# Patient Record
Sex: FEMALE | Race: WHITE | NOT HISPANIC OR LATINO | Employment: PART TIME | URBAN - METROPOLITAN AREA
[De-identification: names, ages, dates, MRNs, and addresses within clinical notes are randomized per-mention and may not be internally consistent; named-entity substitution may affect disease eponyms.]

---

## 2022-10-13 ENCOUNTER — HOSPITAL ENCOUNTER (OUTPATIENT)
Facility: HOSPITAL | Age: 72
LOS: 1 days | Discharge: HOME | End: 2022-10-14
Attending: EMERGENCY MEDICINE | Admitting: EMERGENCY MEDICINE
Payer: MEDICARE

## 2022-10-13 DIAGNOSIS — R07.9 CHEST PAIN: ICD-10-CM

## 2022-10-13 DIAGNOSIS — I47.10 SVT (SUPRAVENTRICULAR TACHYCARDIA): ICD-10-CM

## 2022-10-13 PROCEDURE — 21400001 HC TELEMETRY ROOM

## 2022-10-13 NOTE — PROVIDER TRANSFER
Outside Transfer Acceptance Note / [facility] Referral Center    Referring facility: [facility]- ED   Referring provider: TAWANA GREGORY  Accepting facility: [facility]  Accepting provider: NABIL PEACE  Admitting provider: GILBERTO BEE  Reason for transfer: Higher Level of Care   Transfer diagnosis: svt  Transfer specialty requested: Hospital Medicine  Transfer specialty notified: yes  Transfer level: NUMBER 1-5: 2  Bed type requested: tele  Isolation status: No active isolations   Admission class or status: Obs      Narrative     72-year-old female with a history of diabetes, paroxysmal SVT, hyperlipidemia, gout, gastroesophageal reflux, coronary artery disease, and hypertension presented to [facility] on October 13 with tachycardia. She was last seen by her Cardiologist at [facility] September 27. That note mentioned prior SVT that converted to sinus rhythm with adenosine.  She had cardiac catheterization April 13, 2022 (see results below). On October 13 she presented to the [facility] Emergency Department with palpitations for approximately 4 hours. She felt dizzy but had no chest pain or dyspnea. She attempted several vagal maneuvers at home without improvement.  On presentation she had blood pressure 85/55 with heart rate in the 150s. Initial EKG noted supraventricular tachycardia with no acute ST elevation. She received adenosine and converted to sinus rhythm. She also received aspirin. After conversion to sinus rhythm, repeat EKG had ventricular rate 82 within normal sinus rhythm and no acute ST elevation or depression noted.  Referring provider is requesting transfer for further cardiac evaluation.  Requesting transfer to Hospital Medicine at [facility] for further evaluation.    COVID pending  White blood cells 7.3, hemoglobin 11.9, hematocrit 37.9, platelets 282, sodium 133, potassium 4.4, chloride 96, CO2 25, BUN 21, creatinine 1, glucose 211,  calcium 9.6, AST 19, ALT 18, magnesium 1.8, , troponin 82.2 (normal range 8.4-18.3), BNP 49    Chest x-ray noted no acute cardiopulmonary disease.    VS:  Temperature 98.2°, pulse 75, blood pressure 116/85, O2 sats 99%    Left heart catheterization April 13, 2022:  SUMMARY OF PROCEDURE:        1. Right and left coronary angiogram.      2. Left heart catheterization.      3. Left ventriculogram.      4. Conscious sedation.      5. IFR study to the LAD.      6. FFR study to the LAD.     ASSESSMENT AND PLAN:        1. Angiographically moderate disease in the left anterior descending   with heavy calcification not significant by iFR and FFR criteria.      2. A 40% long lesion in the right coronary artery with heavy   calcification.      3. Otherwise, nonobstructive coronary artery disease.      4. Normal left ventricular end-diastolic pressure and ejection   fraction.     Objective     Vitals:  Temperature 98.2, pulse 75, blood pressure 116/85, O2 sats 99%  Allergies: Review of patient's allergies indicates:  Not on File   NPO: No    Instructions    Admit to Hospital Medicine    Upon patient arrival to floor, please contact Hospital Medicine on call.     SELMA Mar MD  Hospital Medicine Staff  Cell: [phone]

## 2022-10-14 VITALS
BODY MASS INDEX: 33.73 KG/M2 | RESPIRATION RATE: 18 BRPM | WEIGHT: 209.88 LBS | OXYGEN SATURATION: 99 % | TEMPERATURE: 99 F | HEIGHT: 66 IN | SYSTOLIC BLOOD PRESSURE: 163 MMHG | DIASTOLIC BLOOD PRESSURE: 68 MMHG | HEART RATE: 60 BPM

## 2022-10-14 PROBLEM — I21.4 NSTEMI (NON-ST ELEVATED MYOCARDIAL INFARCTION): Status: ACTIVE | Noted: 2022-10-14

## 2022-10-14 PROBLEM — N18.9 ANEMIA DUE TO CHRONIC KIDNEY DISEASE: Status: ACTIVE | Noted: 2022-04-12

## 2022-10-14 PROBLEM — R79.89 TROPONIN LEVEL ELEVATED: Status: ACTIVE | Noted: 2022-10-14

## 2022-10-14 PROBLEM — I25.10 CAD (CORONARY ARTERY DISEASE): Chronic | Status: ACTIVE | Noted: 2022-10-14

## 2022-10-14 PROBLEM — E11.8 DM (DIABETES MELLITUS), TYPE 2 WITH COMPLICATIONS: Status: ACTIVE | Noted: 2022-04-12

## 2022-10-14 PROBLEM — E11.8 DM (DIABETES MELLITUS), TYPE 2 WITH COMPLICATIONS: Chronic | Status: ACTIVE | Noted: 2022-04-12

## 2022-10-14 PROBLEM — I47.10 PAROXYSMAL SVT (SUPRAVENTRICULAR TACHYCARDIA): Chronic | Status: ACTIVE | Noted: 2022-04-12

## 2022-10-14 PROBLEM — E11.9 TYPE 2 DIABETES MELLITUS, WITHOUT LONG-TERM CURRENT USE OF INSULIN: Chronic | Status: ACTIVE | Noted: 2022-04-12

## 2022-10-14 PROBLEM — I10 PRIMARY HYPERTENSION: Chronic | Status: ACTIVE | Noted: 2022-10-14

## 2022-10-14 PROBLEM — D63.1 ANEMIA DUE TO CHRONIC KIDNEY DISEASE: Status: ACTIVE | Noted: 2022-04-12

## 2022-10-14 PROBLEM — I47.10 PAROXYSMAL SVT (SUPRAVENTRICULAR TACHYCARDIA): Status: ACTIVE | Noted: 2022-04-12

## 2022-10-14 PROBLEM — I47.10 SVT (SUPRAVENTRICULAR TACHYCARDIA): Status: ACTIVE | Noted: 2022-10-14

## 2022-10-14 LAB
ANION GAP SERPL CALC-SCNC: 10 MMOL/L (ref 8–16)
BASOPHILS # BLD AUTO: 0.02 K/UL (ref 0–0.2)
BASOPHILS NFR BLD: 0.3 % (ref 0–1.9)
BUN SERPL-MCNC: 17 MG/DL (ref 8–23)
CALCIUM SERPL-MCNC: 9.2 MG/DL (ref 8.7–10.5)
CHLORIDE SERPL-SCNC: 101 MMOL/L (ref 95–110)
CHOLEST SERPL-MCNC: 138 MG/DL (ref 120–199)
CHOLEST/HDLC SERPL: 4.8 {RATIO} (ref 2–5)
CO2 SERPL-SCNC: 26 MMOL/L (ref 23–29)
CREAT SERPL-MCNC: 0.8 MG/DL (ref 0.5–1.4)
DIFFERENTIAL METHOD: ABNORMAL
EOSINOPHIL # BLD AUTO: 0.1 K/UL (ref 0–0.5)
EOSINOPHIL NFR BLD: 1.9 % (ref 0–8)
ERYTHROCYTE [DISTWIDTH] IN BLOOD BY AUTOMATED COUNT: 13.2 % (ref 11.5–14.5)
EST. GFR  (NO RACE VARIABLE): >60 ML/MIN/1.73 M^2
ESTIMATED AVG GLUCOSE: 148 MG/DL (ref 68–131)
GLUCOSE SERPL-MCNC: 142 MG/DL (ref 70–110)
HBA1C MFR BLD: 6.8 % (ref 4–5.6)
HCT VFR BLD AUTO: 30.8 % (ref 37–48.5)
HDLC SERPL-MCNC: 29 MG/DL (ref 40–75)
HDLC SERPL: 21 % (ref 20–50)
HGB BLD-MCNC: 9.9 G/DL (ref 12–16)
IMM GRANULOCYTES # BLD AUTO: 0.02 K/UL (ref 0–0.04)
IMM GRANULOCYTES NFR BLD AUTO: 0.3 % (ref 0–0.5)
LDLC SERPL CALC-MCNC: 83 MG/DL (ref 63–159)
LYMPHOCYTES # BLD AUTO: 1.5 K/UL (ref 1–4.8)
LYMPHOCYTES NFR BLD: 22.4 % (ref 18–48)
MAGNESIUM SERPL-MCNC: 1.6 MG/DL (ref 1.6–2.6)
MCH RBC QN AUTO: 29.6 PG (ref 27–31)
MCHC RBC AUTO-ENTMCNC: 32.1 G/DL (ref 32–36)
MCV RBC AUTO: 92 FL (ref 82–98)
MONOCYTES # BLD AUTO: 0.5 K/UL (ref 0.3–1)
MONOCYTES NFR BLD: 7.6 % (ref 4–15)
NEUTROPHILS # BLD AUTO: 4.5 K/UL (ref 1.8–7.7)
NEUTROPHILS NFR BLD: 67.5 % (ref 38–73)
NONHDLC SERPL-MCNC: 109 MG/DL
NRBC BLD-RTO: 0 /100 WBC
PLATELET # BLD AUTO: 202 K/UL (ref 150–450)
PMV BLD AUTO: 12 FL (ref 9.2–12.9)
POCT GLUCOSE: 132 MG/DL (ref 70–110)
POCT GLUCOSE: 138 MG/DL (ref 70–110)
POTASSIUM SERPL-SCNC: 4.3 MMOL/L (ref 3.5–5.1)
RBC # BLD AUTO: 3.34 M/UL (ref 4–5.4)
SODIUM SERPL-SCNC: 137 MMOL/L (ref 136–145)
TRIGL SERPL-MCNC: 130 MG/DL (ref 30–150)
TROPONIN I SERPL DL<=0.01 NG/ML-MCNC: 0.52 NG/ML (ref 0–0.03)
TROPONIN I SERPL DL<=0.01 NG/ML-MCNC: 0.67 NG/ML (ref 0–0.03)
WBC # BLD AUTO: 6.71 K/UL (ref 3.9–12.7)

## 2022-10-14 PROCEDURE — 84484 ASSAY OF TROPONIN QUANT: CPT | Mod: 91 | Performed by: NURSE PRACTITIONER

## 2022-10-14 PROCEDURE — 36415 COLL VENOUS BLD VENIPUNCTURE: CPT | Performed by: NURSE PRACTITIONER

## 2022-10-14 PROCEDURE — 85025 COMPLETE CBC W/AUTO DIFF WBC: CPT | Performed by: NURSE PRACTITIONER

## 2022-10-14 PROCEDURE — 25000003 PHARM REV CODE 250: Performed by: NURSE PRACTITIONER

## 2022-10-14 PROCEDURE — 99222 PR INITIAL HOSPITAL CARE,LEVL II: ICD-10-PCS | Mod: ,,,

## 2022-10-14 PROCEDURE — 25000003 PHARM REV CODE 250: Performed by: INTERNAL MEDICINE

## 2022-10-14 PROCEDURE — 80061 LIPID PANEL: CPT | Performed by: NURSE PRACTITIONER

## 2022-10-14 PROCEDURE — 63600175 PHARM REV CODE 636 W HCPCS: Performed by: FAMILY MEDICINE

## 2022-10-14 PROCEDURE — 83735 ASSAY OF MAGNESIUM: CPT | Performed by: NURSE PRACTITIONER

## 2022-10-14 PROCEDURE — G0378 HOSPITAL OBSERVATION PER HR: HCPCS

## 2022-10-14 PROCEDURE — 83036 HEMOGLOBIN GLYCOSYLATED A1C: CPT | Performed by: NURSE PRACTITIONER

## 2022-10-14 PROCEDURE — 25000003 PHARM REV CODE 250: Performed by: FAMILY MEDICINE

## 2022-10-14 PROCEDURE — 80048 BASIC METABOLIC PNL TOTAL CA: CPT | Performed by: NURSE PRACTITIONER

## 2022-10-14 PROCEDURE — 99222 1ST HOSP IP/OBS MODERATE 55: CPT | Mod: ,,,

## 2022-10-14 RX ORDER — TALC
6 POWDER (GRAM) TOPICAL NIGHTLY PRN
Status: DISCONTINUED | OUTPATIENT
Start: 2022-10-14 | End: 2022-10-14 | Stop reason: HOSPADM

## 2022-10-14 RX ORDER — ENOXAPARIN SODIUM 100 MG/ML
1 INJECTION SUBCUTANEOUS
Status: DISCONTINUED | OUTPATIENT
Start: 2022-10-14 | End: 2022-10-14 | Stop reason: HOSPADM

## 2022-10-14 RX ORDER — HYDROCODONE BITARTRATE AND ACETAMINOPHEN 7.5; 325 MG/1; MG/1
1 TABLET ORAL 2 TIMES DAILY PRN
Status: DISCONTINUED | OUTPATIENT
Start: 2022-10-14 | End: 2022-10-14 | Stop reason: HOSPADM

## 2022-10-14 RX ORDER — LISINOPRIL 5 MG/1
5 TABLET ORAL DAILY
Status: ON HOLD | COMMUNITY
Start: 2022-09-21 | End: 2022-10-14 | Stop reason: HOSPADM

## 2022-10-14 RX ORDER — ALLOPURINOL 300 MG/1
300 TABLET ORAL DAILY
COMMUNITY
Start: 2022-07-08

## 2022-10-14 RX ORDER — ATORVASTATIN CALCIUM 40 MG/1
40 TABLET, FILM COATED ORAL DAILY
Status: DISCONTINUED | OUTPATIENT
Start: 2022-10-14 | End: 2022-10-14 | Stop reason: HOSPADM

## 2022-10-14 RX ORDER — ONDANSETRON 2 MG/ML
4 INJECTION INTRAMUSCULAR; INTRAVENOUS EVERY 8 HOURS PRN
Status: DISCONTINUED | OUTPATIENT
Start: 2022-10-14 | End: 2022-10-14 | Stop reason: HOSPADM

## 2022-10-14 RX ORDER — LOVASTATIN 20 MG/1
20 TABLET ORAL DAILY
COMMUNITY
Start: 2022-07-08

## 2022-10-14 RX ORDER — METOPROLOL TARTRATE 25 MG/1
25 TABLET, FILM COATED ORAL 2 TIMES DAILY
Status: DISCONTINUED | OUTPATIENT
Start: 2022-10-14 | End: 2022-10-14 | Stop reason: HOSPADM

## 2022-10-14 RX ORDER — LATANOPROST 50 UG/ML
1 SOLUTION/ DROPS OPHTHALMIC NIGHTLY
COMMUNITY
Start: 2022-09-26

## 2022-10-14 RX ORDER — FERROUS SULFATE 324(65)MG
324 TABLET, DELAYED RELEASE (ENTERIC COATED) ORAL
COMMUNITY

## 2022-10-14 RX ORDER — LISINOPRIL 5 MG/1
5 TABLET ORAL ONCE
Status: COMPLETED | OUTPATIENT
Start: 2022-10-14 | End: 2022-10-14

## 2022-10-14 RX ORDER — ASPIRIN 81 MG/1
81 TABLET ORAL DAILY
Status: DISCONTINUED | OUTPATIENT
Start: 2022-10-14 | End: 2022-10-14 | Stop reason: HOSPADM

## 2022-10-14 RX ORDER — GLUCAGON 1 MG
1 KIT INJECTION
Status: DISCONTINUED | OUTPATIENT
Start: 2022-10-14 | End: 2022-10-14 | Stop reason: HOSPADM

## 2022-10-14 RX ORDER — TEMAZEPAM 30 MG/1
30 CAPSULE ORAL NIGHTLY
COMMUNITY
Start: 2022-10-05

## 2022-10-14 RX ORDER — LISINOPRIL 10 MG/1
10 TABLET ORAL DAILY
Status: DISCONTINUED | OUTPATIENT
Start: 2022-10-15 | End: 2022-10-14 | Stop reason: HOSPADM

## 2022-10-14 RX ORDER — LATANOPROST 50 UG/ML
1 SOLUTION/ DROPS OPHTHALMIC NIGHTLY
Status: DISCONTINUED | OUTPATIENT
Start: 2022-10-14 | End: 2022-10-14 | Stop reason: HOSPADM

## 2022-10-14 RX ORDER — PROMETHAZINE HYDROCHLORIDE 25 MG/1
25 TABLET ORAL EVERY 6 HOURS PRN
Status: DISCONTINUED | OUTPATIENT
Start: 2022-10-14 | End: 2022-10-14 | Stop reason: HOSPADM

## 2022-10-14 RX ORDER — METFORMIN HYDROCHLORIDE 1000 MG/1
1000 TABLET ORAL 3 TIMES DAILY
COMMUNITY
Start: 2022-09-02

## 2022-10-14 RX ORDER — NALOXONE HYDROCHLORIDE 4 MG/.1ML
SPRAY NASAL
COMMUNITY
Start: 2022-08-11

## 2022-10-14 RX ORDER — METOPROLOL TARTRATE 25 MG/1
25 TABLET, FILM COATED ORAL 2 TIMES DAILY
Qty: 60 TABLET | Refills: 3 | Status: SHIPPED | OUTPATIENT
Start: 2022-10-14 | End: 2023-10-14

## 2022-10-14 RX ORDER — INSULIN ASPART 100 [IU]/ML
0-5 INJECTION, SOLUTION INTRAVENOUS; SUBCUTANEOUS
Status: DISCONTINUED | OUTPATIENT
Start: 2022-10-14 | End: 2022-10-14 | Stop reason: HOSPADM

## 2022-10-14 RX ORDER — IBUPROFEN 200 MG
24 TABLET ORAL
Status: DISCONTINUED | OUTPATIENT
Start: 2022-10-14 | End: 2022-10-14 | Stop reason: HOSPADM

## 2022-10-14 RX ORDER — IBUPROFEN 200 MG
16 TABLET ORAL
Status: DISCONTINUED | OUTPATIENT
Start: 2022-10-14 | End: 2022-10-14 | Stop reason: HOSPADM

## 2022-10-14 RX ORDER — ATENOLOL 100 MG/1
100 TABLET ORAL DAILY
Status: ON HOLD | COMMUNITY
Start: 2022-07-08 | End: 2022-10-14 | Stop reason: HOSPADM

## 2022-10-14 RX ORDER — OMEPRAZOLE 20 MG/1
20 CAPSULE, DELAYED RELEASE ORAL DAILY
COMMUNITY
Start: 2022-09-02

## 2022-10-14 RX ORDER — ACETAMINOPHEN 325 MG/1
650 TABLET ORAL EVERY 6 HOURS PRN
Status: DISCONTINUED | OUTPATIENT
Start: 2022-10-14 | End: 2022-10-14 | Stop reason: HOSPADM

## 2022-10-14 RX ORDER — LISINOPRIL 5 MG/1
5 TABLET ORAL DAILY
Status: DISCONTINUED | OUTPATIENT
Start: 2022-10-14 | End: 2022-10-14

## 2022-10-14 RX ORDER — BISACODYL 10 MG
10 SUPPOSITORY, RECTAL RECTAL DAILY PRN
Status: DISCONTINUED | OUTPATIENT
Start: 2022-10-14 | End: 2022-10-14 | Stop reason: HOSPADM

## 2022-10-14 RX ORDER — VENLAFAXINE HYDROCHLORIDE 75 MG/1
150 CAPSULE, EXTENDED RELEASE ORAL DAILY
Status: DISCONTINUED | OUTPATIENT
Start: 2022-10-14 | End: 2022-10-14 | Stop reason: HOSPADM

## 2022-10-14 RX ORDER — LANOLIN ALCOHOL/MO/W.PET/CERES
1 CREAM (GRAM) TOPICAL DAILY
Status: DISCONTINUED | OUTPATIENT
Start: 2022-10-14 | End: 2022-10-14 | Stop reason: HOSPADM

## 2022-10-14 RX ORDER — GLIMEPIRIDE 4 MG/1
4 TABLET ORAL EVERY MORNING
COMMUNITY
Start: 2022-07-08

## 2022-10-14 RX ORDER — ASPIRIN 81 MG/1
81 TABLET ORAL DAILY
Qty: 90 TABLET | Refills: 3 | Status: SHIPPED | OUTPATIENT
Start: 2022-10-15 | End: 2023-10-15

## 2022-10-14 RX ORDER — SODIUM CHLORIDE 0.9 % (FLUSH) 0.9 %
10 SYRINGE (ML) INJECTION EVERY 12 HOURS PRN
Status: DISCONTINUED | OUTPATIENT
Start: 2022-10-14 | End: 2022-10-14 | Stop reason: HOSPADM

## 2022-10-14 RX ORDER — HYDROCODONE BITARTRATE AND ACETAMINOPHEN 7.5; 325 MG/1; MG/1
1 TABLET ORAL 2 TIMES DAILY PRN
COMMUNITY
Start: 2022-10-07

## 2022-10-14 RX ORDER — NITROGLYCERIN 0.4 MG/1
0.4 TABLET SUBLINGUAL EVERY 5 MIN PRN
Status: DISCONTINUED | OUTPATIENT
Start: 2022-10-14 | End: 2022-10-14 | Stop reason: HOSPADM

## 2022-10-14 RX ORDER — CHLORTHALIDONE 25 MG/1
25 TABLET ORAL DAILY
COMMUNITY
Start: 2022-07-11

## 2022-10-14 RX ORDER — NALOXONE HCL 0.4 MG/ML
0.02 VIAL (ML) INJECTION
Status: DISCONTINUED | OUTPATIENT
Start: 2022-10-14 | End: 2022-10-14 | Stop reason: HOSPADM

## 2022-10-14 RX ORDER — ALENDRONATE SODIUM 70 MG/1
70 TABLET ORAL
COMMUNITY
Start: 2022-09-02

## 2022-10-14 RX ORDER — ONDANSETRON 8 MG/1
8 TABLET, ORALLY DISINTEGRATING ORAL 3 TIMES DAILY PRN
COMMUNITY
Start: 2022-09-02

## 2022-10-14 RX ORDER — MAG HYDROX/ALUMINUM HYD/SIMETH 200-200-20
30 SUSPENSION, ORAL (FINAL DOSE FORM) ORAL 4 TIMES DAILY PRN
Status: DISCONTINUED | OUTPATIENT
Start: 2022-10-14 | End: 2022-10-14 | Stop reason: HOSPADM

## 2022-10-14 RX ORDER — LISINOPRIL 10 MG/1
10 TABLET ORAL DAILY
Qty: 30 TABLET | Refills: 3 | Status: SHIPPED | OUTPATIENT
Start: 2022-10-14 | End: 2022-11-13

## 2022-10-14 RX ORDER — VENLAFAXINE HYDROCHLORIDE 150 MG/1
150 CAPSULE, EXTENDED RELEASE ORAL 2 TIMES DAILY
COMMUNITY
Start: 2022-09-02

## 2022-10-14 RX ADMIN — FERROUS SULFATE TAB 325 MG (65 MG ELEMENTAL FE) 1 EACH: 325 (65 FE) TAB at 09:10

## 2022-10-14 RX ADMIN — ATORVASTATIN CALCIUM 40 MG: 40 TABLET, FILM COATED ORAL at 09:10

## 2022-10-14 RX ADMIN — ENOXAPARIN SODIUM 100 MG: 100 INJECTION SUBCUTANEOUS at 03:10

## 2022-10-14 RX ADMIN — VENLAFAXINE HYDROCHLORIDE 150 MG: 75 CAPSULE, EXTENDED RELEASE ORAL at 09:10

## 2022-10-14 RX ADMIN — LISINOPRIL 5 MG: 5 TABLET ORAL at 12:10

## 2022-10-14 RX ADMIN — METOPROLOL TARTRATE 25 MG: 25 TABLET, FILM COATED ORAL at 09:10

## 2022-10-14 RX ADMIN — ASPIRIN 81 MG: 81 TABLET, COATED ORAL at 09:10

## 2022-10-14 RX ADMIN — LISINOPRIL 5 MG: 5 TABLET ORAL at 09:10

## 2022-10-14 NOTE — DISCHARGE SUMMARY
[facility] Telemetry ([facility] Medicine  Discharge Summary      Patient Name: Che An  MRN: [id number]  Patient Class: OP- Observation  Admission Date: 10/13/2022  Hospital Length of Stay: 1 days  Discharge Date and Time:  10/14/2022 12:21 PM  Attending Physician: Dex Powell MD   Discharging Provider: Moreno Carter MD  Primary Care Provider: Caridad Gentile MD      HPI:   72-year-old female with a history of diabetes, paroxysmal SVT, hyperlipidemia, gout, gastroesophageal reflux, coronary artery disease, and hypertension presented to [place] [facility] on October 13 with tachycardia. She was last seen by her Cardiologist at [place] September 27. That note mentioned prior SVT that converted to sinus rhythm with adenosine.  She had cardiac catheterization April 13, 2022 (see results below). On October 13 she presented to the [facility] Emergency Department with palpitations for approximately 4 hours. She felt dizzy but had no chest pain or dyspnea. She attempted several vagal maneuvers at home without improvement.  On presentation she had blood pressure 85/55 with heart rate in the 150s. Initial EKG noted supraventricular tachycardia with no acute ST elevation. She received adenosine and converted to sinus rhythm. She also received aspirin. After conversion to sinus rhythm, repeat EKG had ventricular rate 82 within normal sinus rhythm and no acute ST elevation or depression noted.  Referring provider is requesting transfer for further cardiac evaluation.  Requesting transfer to Hospital Medicine at [facility] for further evaluation.     COVID pending  White blood cells 7.3, hemoglobin 11.9, hematocrit 37.9, platelets 282, sodium 133, potassium 4.4, chloride 96, CO2 25, BUN 21, creatinine 1, glucose 211, calcium 9.6, AST 19, ALT 18, magnesium 1.8, , troponin 82.2 (normal range 8.4-18.3), BNP 49     Chest x-ray noted no acute cardiopulmonary disease.     Left heart  catheterization April 13, 2022:  SUMMARY OF PROCEDURE:        1. Right and left coronary angiogram.      2. Left heart catheterization.      3. Left ventriculogram.      4. Conscious sedation.      5. IFR study to the LAD.      6. FFR study to the LAD.     ASSESSMENT AND PLAN:        1. Angiographically moderate disease in the left anterior descending   with heavy calcification not significant by iFR and FFR criteria.      2. A 40% long lesion in the right coronary artery with heavy   calcification.      3. Otherwise, nonobstructive coronary artery disease.      4. Normal left ventricular end-diastolic pressure and ejection   fraction.       * No surgery found *      Hospital Course:   71 y/o WF admitted with a dx of SVT and elevated troponin .  She is s/p adenosine at the ER and convert back to SNR .  Cardiology consulted and rec no further intervention and can be d/c home today . The atenolol changed to metoprolol 25 mg  po bid and lisinopril form 5 mg po qd to 10 mg po daily .  Per cardiology  troponin elevation due to demand ischemia / SVT episode . Pt was seen and examined at bedside . She was determined to be suitable for D/C . She was  advised to f/u with cardiology , EPS and PCP in 1 to 2 weeks .        Goals of Care Treatment Preferences:  Code Status: Full Code      Consults:   Consults (From admission, onward)        Status Ordering Provider     Inpatient consult to Cardiology  Once        Provider:  (Not yet assigned)    Completed AZUL COLINDRES          CAD (coronary artery disease)  - Continue medical management as above.      Primary hypertension  - Continue home antihypertensives.   -increased lisinopril from 5 mg po qd to 10 mg po qd     Type 2 diabetes mellitus, without long-term current use of insulin  Patient's FSGs are controlled on current medication regimen.  Last A1c reviewed-   Lab Results   Component Value Date    HGBA1C 6.8 (H) 10/14/2022     Most recent fingerstick glucose reviewed-   Recent  Labs   Lab 10/14/22  0537 10/14/22  1104   POCTGLUCOSE 138* 132*     Current correctional scale  Low  Maintain anti-hyperglycemic dose as follows-   Antihyperglycemics (From admission, onward)    Start     Stop Route Frequency Ordered    10/14/22 0304  insulin aspart U-100 pen 0-5 Units         -- SubQ Before meals & nightly PRN 10/14/22 0205        Hold Oral hypoglycemics while patient is in the hospital.      Paroxysmal SVT (supraventricular tachycardia)  - Converted back to sinus rhythm after adenosine given in the ED. Monitor telemetry.   - Started on metoprolol   -F/U with cardiology and EPS outpt          Final Active Diagnoses:    Diagnosis Date Noted POA    PRINCIPAL PROBLEM:  Troponin level elevated [R77.8] 10/14/2022 Yes    Primary hypertension [I10] 10/14/2022 Yes     Chronic    CAD (coronary artery disease) [I25.10] 10/14/2022 Yes     Chronic    Paroxysmal SVT (supraventricular tachycardia) [I47.1] 04/12/2022 Yes     Chronic    Type 2 diabetes mellitus, without long-term current use of insulin [E11.9] 04/12/2022 Yes     Chronic      Problems Resolved During this Admission:       Discharged Condition: stable    Disposition: Home or Self Care    Follow Up:   Follow-up Information     Caridad Gentile MD Follow up in 1 week(s).    Specialty: Family Medicine  Contact information:  [address] [place] [id number] [phone]                       Patient Instructions:      Diet Cardiac     Activity as tolerated       Significant Diagnostic Studies: Labs:   BMP:   Recent Labs   Lab 10/14/22  0453   *      K 4.3      CO2 26   BUN 17   CREATININE 0.8   CALCIUM 9.2   MG 1.6   , CMP   Recent Labs   Lab 10/14/22  0453      K 4.3      CO2 26   *   BUN 17   CREATININE 0.8   CALCIUM 9.2   ANIONGAP 10    and CBC   Recent Labs   Lab 10/14/22  0453   WBC 6.71   HGB 9.9*   HCT 30.8*        Microbiology: Blood Culture No results found for: LABBLOO    Pending  Diagnostic Studies:     Procedure Component Value Units Date/Time    Troponin I [[id number]]     Order Status: Sent Lab Status: No result     Specimen: Blood          Medications:  Reconciled Home Medications:      Medication List      START taking these medications    aspirin 81 MG EC tablet  Commonly known as: ECOTRIN  Take 1 tablet (81 mg total) by mouth once daily.  Start taking on: October 15, 2022     metoprolol tartrate 25 MG tablet  Commonly known as: LOPRESSOR  Take 1 tablet (25 mg total) by mouth 2 (two) times daily.        CHANGE how you take these medications    lisinopriL 10 MG tablet  Take 1 tablet (10 mg total) by mouth once daily.  What changed:   · medication strength  · how much to take        CONTINUE taking these medications    alendronate 70 MG tablet  Commonly known as: FOSAMAX  Take 70 mg by mouth every 7 days.     allopurinoL 300 MG tablet  Commonly known as: ZYLOPRIM  Take 300 mg by mouth once daily.     chlorthalidone 25 MG Tab  Commonly known as: HYGROTEN  Take 25 mg by mouth once daily.     ferrous sulfate 324 mg (65 mg iron) Tbec  Take 324 mg by mouth.     glimepiride 4 MG tablet  Commonly known as: AMARYL  Take 4 mg by mouth every morning.     HYDROcodone-acetaminophen 7.5-325 mg per tablet  Commonly known as: NORCO  Take 1 tablet by mouth 2 (two) times daily as needed.     latanoprost 0.005 % ophthalmic solution  Place 1 drop into both eyes every evening.     lovastatin 20 MG tablet  Commonly known as: MEVACOR  Take 20 mg by mouth once daily.     metFORMIN 1000 MG tablet  Commonly known as: GLUCOPHAGE  Take 1,000 mg by mouth 3 (three) times daily.     naloxone 4 mg/actuation Spry  Commonly known as: NARCAN  as directed     omeprazole 20 MG capsule  Commonly known as: PRILOSEC  Take 20 mg by mouth once daily.     ondansetron 8 MG Tbdl  Commonly known as: ZOFRAN-ODT  Take 8 mg by mouth 3 (three) times daily as needed.     temazepam 30 mg capsule  Commonly known as: RESTORIL  Take 30 mg by  mouth every evening.     venlafaxine 150 MG Cp24  Commonly known as: EFFEXOR-XR  Take 150 mg by mouth 2 (two) times daily.        STOP taking these medications    atenoloL 100 MG tablet  Commonly known as: TENORMIN            Indwelling Lines/Drains at time of discharge:   Lines/Drains/Airways     None                 Time spent on the discharge of patient: 31 minutes         Moreno Carter MD  Department of Hospital Medicine  [facility] - Telemetry ([facility])

## 2022-10-14 NOTE — HOSPITAL COURSE
71 y/o WF admitted with a dx of SVT and elevated troponin .  She is s/p adenosine at the ER and convert back to SNR .  Cardiology consulted and rec no further intervention and can be d/c home today . The atenolol changed to metoprolol 25 mg  po bid and lisinopril form 5 mg po qd to 10 mg po daily .  Per cardiology  troponin elevation due to demand ischemia / SVT episode . Pt was seen and examined at bedside . She was determined to be suitable for D/C . She was  advised to f/u with cardiology , EPS and PCP in 1 to 2 weeks .

## 2022-10-14 NOTE — PLAN OF CARE
Pt A&O x4. IV intact, dry, and clean. NS on monitor. Pt ambulates and turns in bed independently. On room air. No pain reported. NPO at this time. Awaiting orders from provider. Instructed to call for assistance. Hourly rounding completed.

## 2022-10-14 NOTE — ASSESSMENT & PLAN NOTE
- Type I vs. Type II demand secondary to SVT. Troponin 82.2 (normal range 8.4-18.3) in the ED. EKG without acute ischemic ST-T changes. No CP or anginal equivalent.     - Will give therapeutic dose Lovenox.  - ASA, BB, and statin.    - Trend serial cardiac enzymes and EKG.  - Check FLP.  - Cardiology consult. Will keep NPO for possible LHC.

## 2022-10-14 NOTE — HPI
72-year-old female with a history of diabetes, paroxysmal SVT, hyperlipidemia, gout, gastroesophageal reflux, coronary artery disease, and hypertension presented to [place] [facility] on October 13 with tachycardia. She was last seen by her Cardiologist at [place] September 27. That note mentioned prior SVT that converted to sinus rhythm with adenosine.  She had cardiac catheterization April 13, 2022 (see results below). On October 13 she presented to the [facility] Emergency Department with palpitations for approximately 4 hours. She felt dizzy but had no chest pain or dyspnea. She attempted several vagal maneuvers at home without improvement.  On presentation she had blood pressure 85/55 with heart rate in the 150s. Initial EKG noted supraventricular tachycardia with no acute ST elevation. She received adenosine and converted to sinus rhythm. She also received aspirin. After conversion to sinus rhythm, repeat EKG had ventricular rate 82 within normal sinus rhythm and no acute ST elevation or depression noted.  Referring provider is requesting transfer for further cardiac evaluation.  Requesting transfer to Hospital Medicine at [facility] for further evaluation. In ED White blood cells 7.3, hemoglobin 11.9, hematocrit 37.9, platelets 282, sodium 133, potassium 4.4, chloride 96, CO2 25, BUN 21, creatinine 1, glucose 211, calcium 9.6, AST 19, ALT 18, magnesium 1.8, , troponin 82.2 (normal range 8.4-18.3), BNP 49. Chest x-ray noted no acute cardiopulmonary disease.     Left heart catheterization April 13, 2022:  SUMMARY OF PROCEDURE:        1. Right and left coronary angiogram.      2. Left heart catheterization.      3. Left ventriculogram.      4. Conscious sedation.      5. IFR study to the LAD.      6. FFR study to the LAD.     ASSESSMENT AND PLAN:        1. Angiographically moderate disease in the left anterior descending   with heavy calcification not significant by iFR and FFR criteria.       2. A 40% long lesion in the right coronary artery with heavy   calcification.      3. Otherwise, nonobstructive coronary artery disease.      4. Normal left ventricular end-diastolic pressure and ejection   fraction.     Cardiology consulted for NSTEMI, SVT. Pt seen and examined today, resting comfortably in bed, no CV complaints at this time. EKG reviewed NSR, Labs reviewed Troponin .671-> .519. Pt sees Dr. Rolle in [place]. Needs follow up to discuss ablation, SAIDA workup OP

## 2022-10-14 NOTE — SUBJECTIVE & OBJECTIVE
Past Medical History:   Diagnosis Date    Coronary artery disease     Diabetes mellitus     GERD (gastroesophageal reflux disease)     Gout, unspecified     Hyperlipidemia     Hypertension     SVT (supraventricular tachycardia)        Past Surgical History:   Procedure Laterality Date    CARDIAC CATHETERIZATION         Review of patient's allergies indicates:   Allergen Reactions    Adhesive        No current facility-administered medications on file prior to encounter.     Current Outpatient Medications on File Prior to Encounter   Medication Sig    alendronate (FOSAMAX) 70 MG tablet Take 70 mg by mouth every 7 days.    allopurinoL (ZYLOPRIM) 300 MG tablet Take 300 mg by mouth once daily.    atenoloL (TENORMIN) 100 MG tablet Take 100 mg by mouth once daily.    chlorthalidone (HYGROTEN) 25 MG Tab Take 25 mg by mouth once daily.    ferrous sulfate 324 mg (65 mg iron) TbEC Take 324 mg by mouth.    glimepiride (AMARYL) 4 MG tablet Take 4 mg by mouth every morning.    HYDROcodone-acetaminophen (NORCO) 7.5-325 mg per tablet Take 1 tablet by mouth 2 (two) times daily as needed.    latanoprost 0.005 % ophthalmic solution Place 1 drop into both eyes every evening.    lisinopriL (PRINIVIL,ZESTRIL) 5 MG tablet Take 5 mg by mouth once daily.    lovastatin (MEVACOR) 20 MG tablet Take 20 mg by mouth once daily.    metFORMIN (GLUCOPHAGE) 1000 MG tablet Take 1,000 mg by mouth 3 (three) times daily.    naloxone (NARCAN) 4 mg/actuation Spry as directed    omeprazole (PRILOSEC) 20 MG capsule Take 20 mg by mouth once daily.    ondansetron (ZOFRAN-ODT) 8 MG TbDL Take 8 mg by mouth 3 (three) times daily as needed.    temazepam (RESTORIL) 30 mg capsule Take 30 mg by mouth every evening.    venlafaxine (EFFEXOR-XR) 150 MG Cp24 Take 150 mg by mouth 2 (two) times daily.     Family History    None       Tobacco Use    Smoking status: Never    Smokeless tobacco: Never   Substance and Sexual Activity    Alcohol use: Not Currently    Drug use:  Never    Sexual activity: Not on file     Review of Systems   Constitutional: Negative.   HENT: Negative.     Eyes: Negative.    Cardiovascular: Negative.    Respiratory: Negative.     Skin: Negative.    Musculoskeletal: Negative.    Gastrointestinal: Negative.    Genitourinary: Negative.    Neurological: Negative.    Psychiatric/Behavioral: Negative.     Objective:     Vital Signs (Most Recent):  Temp: 97.8 °F (36.6 °C) (10/14/22 0751)  Pulse: 62 (10/14/22 0759)  Resp: 16 (10/14/22 0741)  BP: (!) 150/66 (10/14/22 0803)  SpO2: 97 % (10/14/22 0741)   Vital Signs (24h Range):  Temp:  [97.8 °F (36.6 °C)-98.3 °F (36.8 °C)] 97.8 °F (36.6 °C)  Pulse:  [56-75] 62  Resp:  [16-20] 16  SpO2:  [97 %-100 %] 97 %  BP: (116-185)/(59-85) 150/66     Weight: 95.2 kg (209 lb 14.1 oz)  Body mass index is 33.88 kg/m².    SpO2: 97 %  O2 Device (Oxygen Therapy): room air    No intake or output data in the 24 hours ending 10/14/22 1053    Lines/Drains/Airways       Peripheral Intravenous Line  Duration                  Peripheral IV - Single Lumen 10/13/22 2000 20 G Anterior;Right Forearm <1 day                    Physical Exam  Vitals and nursing note reviewed.   Constitutional:       Appearance: Normal appearance.   HENT:      Head: Normocephalic.   Eyes:      Pupils: Pupils are equal, round, and reactive to light.   Cardiovascular:      Rate and Rhythm: Normal rate and regular rhythm.      Heart sounds: Normal heart sounds, S1 normal and S2 normal. No murmur heard.    No S3 or S4 sounds.   Pulmonary:      Effort: Pulmonary effort is normal.      Breath sounds: Normal breath sounds.   Abdominal:      General: Bowel sounds are normal.      Palpations: Abdomen is soft.   Musculoskeletal:         General: Normal range of motion.      Cervical back: Normal range of motion.   Skin:     Capillary Refill: Capillary refill takes less than 2 seconds.   Neurological:      General: No focal deficit present.      Mental Status: She is alert and  oriented to person, place, and time.   Psychiatric:         Mood and Affect: Mood normal.         Behavior: Behavior normal.         Thought Content: Thought content normal.       Significant Labs: BMP:   Recent Labs   Lab 10/14/22  0453   *      K 4.3      CO2 26   BUN 17   CREATININE 0.8   CALCIUM 9.2   MG 1.6   , CMP   Recent Labs   Lab 10/14/22  0453      K 4.3      CO2 26   *   BUN 17   CREATININE 0.8   CALCIUM 9.2   ANIONGAP 10   , CBC   Recent Labs   Lab 10/14/22  0453   WBC 6.71   HGB 9.9*   HCT 30.8*      , INR No results for input(s): INR, PROTIME in the last 48 hours., Lipid Panel No results for input(s): CHOL, HDL, LDLCALC, TRIG, CHOLHDL in the last 48 hours., Troponin   Recent Labs   Lab 10/14/22  0232 10/14/22  0840   TROPONINI 0.671* 0.519*   , and All pertinent lab results from the last 24 hours have been reviewed.    Significant Imaging: and EKG: Reviewed

## 2022-10-14 NOTE — HPI
72-year-old female with a history of diabetes, paroxysmal SVT, hyperlipidemia, gout, gastroesophageal reflux, coronary artery disease, and hypertension presented to [place] [facility] on October 13 with tachycardia. She was last seen by her Cardiologist at [place] September 27. That note mentioned prior SVT that converted to sinus rhythm with adenosine.  She had cardiac catheterization April 13, 2022 (see results below). On October 13 she presented to the [facility] Emergency Department with palpitations for approximately 4 hours. She felt dizzy but had no chest pain or dyspnea. She attempted several vagal maneuvers at home without improvement.  On presentation she had blood pressure 85/55 with heart rate in the 150s. Initial EKG noted supraventricular tachycardia with no acute ST elevation. She received adenosine and converted to sinus rhythm. She also received aspirin. After conversion to sinus rhythm, repeat EKG had ventricular rate 82 within normal sinus rhythm and no acute ST elevation or depression noted.  Referring provider is requesting transfer for further cardiac evaluation.  Requesting transfer to Hospital Medicine at [facility] for further evaluation.     COVID pending  White blood cells 7.3, hemoglobin 11.9, hematocrit 37.9, platelets 282, sodium 133, potassium 4.4, chloride 96, CO2 25, BUN 21, creatinine 1, glucose 211, calcium 9.6, AST 19, ALT 18, magnesium 1.8, , troponin 82.2 (normal range 8.4-18.3), BNP 49     Chest x-ray noted no acute cardiopulmonary disease.     Left heart catheterization April 13, 2022:  SUMMARY OF PROCEDURE:        1. Right and left coronary angiogram.      2. Left heart catheterization.      3. Left ventriculogram.      4. Conscious sedation.      5. IFR study to the LAD.      6. FFR study to the LAD.     ASSESSMENT AND PLAN:        1. Angiographically moderate disease in the left anterior descending   with heavy calcification not significant by iFR  and FFR criteria.      2. A 40% long lesion in the right coronary artery with heavy   calcification.      3. Otherwise, nonobstructive coronary artery disease.      4. Normal left ventricular end-diastolic pressure and ejection   fraction.

## 2022-10-14 NOTE — ASSESSMENT & PLAN NOTE
- Converted back to sinus rhythm after adenosine given in the ED. Monitor telemetry.   - Started on metoprolol   -F/U with cardiology and EPS outpt

## 2022-10-14 NOTE — ASSESSMENT & PLAN NOTE
- Converted back to sinus rhythm after adenosine given in the ED. Monitor telemetry.   - Continue BB.  - Check potassium and mag, replace as needed.   - Cardiology consult.

## 2022-10-14 NOTE — CODE 44
"MEDICARE CONDITION 44    (Reference: Transmittal 200 of the Medicare Manual)    A Medicare "Inpatient Admission" may be changed to an "Outpatient" (includes  Outpatient Observation) status, if the following conditions exist:  The change in patient status from inpatient to outpatient is made prior to        discharge or release, while the patient is still a patient in the hospital.   The hospital has not submitted a claim for the inpatient admission.  A physician concurs with the Utilization Committee decision.   Physician concurrence with the Utilization Committee's decision is documented         in the patient's records.         IMPLEMENTATION OF CONDITION CODE 44    Inpatient status has been reviewed prior to discharge. Change to Outpatient Observation status, in accordance with Condition Code 44.     By entering this in the medical record, I verify that I have reviewed and concur with the findings of the Utilization Review Committee and the Utilization Review Physician, and that the identified Patient does not meet medical necessity for Inpatient status. This patient is appropriate for the downgrade in status because upon subsequent review, it was determined that the patient did not meet the medical necessity for inpatient care. Outpatient Observation is the identified level of care appropriate for the Patient, and all of the above conditions for this status change have been met.     [facility]   Attending MD  "

## 2022-10-14 NOTE — NURSING
Pt being discharged, refused prescribed meds from [facility] pharmacy, states she will follow up with PCP on Monday to discuss other options.

## 2022-10-14 NOTE — PLAN OF CARE
[facility] - Tele[facility] (Hospital)  Initial Discharge Assessment       Primary Care Provider: Caridad Gentile MD    Admission Diagnosis: SVT (supraventricular tachycardia) [I47.1]    Admission Date: 10/13/2022  Expected Discharge Date: 10/14/2022    Discharge Barriers Identified: None    Payor: HU[employer] MANAGED MEDICARE / Plan: HU[employer] MEDICARE HMO / Product Type: Capitation /     Extended Emergency Contact Information  Primary Emergency Contact: Koby Veras  Mobile Phone: [phone]  Relation: Son   needed? No  Secondary Emergency Contact: Julianna Veras  Mobile Phone: [phone]  Relation: Daughter  Preferred language: English   needed? No    Discharge Plan A: Home       No Pharmacies Listed    Initial Assessment (most recent)       Adult Discharge Assessment - 10/14/22 1217          Discharge Assessment    Assessment Type Discharge Planning Assessment     Confirmed/corrected address, phone number and insurance Yes     Confirmed Demographics Correct on Facesheet     Source of Information patient     Communicated HAYLEY with patient/caregiver Date not available/Unable to determine     Reason For Admission ELEVATED TROPONIN     Lives With alone     Facility Arrived From: HOME     Do you expect to return to your current living situation? Yes     Do you have help at home or someone to help you manage your care at home? Yes     Who are your caregiver(s) and their phone number(s)? indepedendent, son and DIL can assist if needed     Prior to hospitilization cognitive status: Alert/Oriented     Current cognitive status: Alert/Oriented     Walking or Climbing Stairs Difficulty none     Dressing/Bathing Difficulty none     Equipment Currently Used at Home cane, straight     Readmission within 30 days? No     Patient currently being followed by outpatient case management? No     Do you currently have service(s) that help you manage your care at home? No     Do you take prescription medications? Yes     Do you  Problem: Self Care Deficits Care Plan (Adult)  Goal: *Acute Goals and Plan of Care (Insert Text)  Description: Occupational Therapy Goals  Initiated 9/21/2022 within 7 day(s). 1.  Patient will perform bed mobility in preparation for ADLs with supervision/set-up. 2.  Patient will perform upper body dressing with supervision/set-up. 3.  Patient will perform lower body dressing with supervision/set-up. 4.  Patient will perform toilet transfers with minimal assistance/contact guard assist.  5.  Patient will perform all aspects of toileting with minimal assistance/contact guard assist.  6.  Patient will participate in upper extremity therapeutic exercise/activities with modified independence for 8 minutes to increase ROM/ strength for ADLs. 7.  Patient will utilize energy conservation techniques during functional activities with verbal cues. Prior Level of Function: Patient lives with son and was independent with self-care and used a rolling walker for functional mobility PTA. Patient reports she still does all of her grocery shopping/ IADLs. Outcome: Progressing Towards Goal   OCCUPATIONAL THERAPY TREATMENT    Patient: Neema Garcia (38 y.o. female)  Date: 9/22/2022  Diagnosis: Hip fracture ([facility] [address] [S72.009A] <principal problem not specified>  Procedure(s) (LRB):  RIGHT HIP HEMIARTHROPLASTY LATERAL/STYKER (Right) 2 Days Post-Op  Precautions: Fall, Total hip, WBAT (RLE; R cast placed on wrist -forearm d/t fifth metacarpal fx)  PLOF: Patient lives with son and was independent with self-care and used a rolling walker for functional mobility PTA. Patient reports she still does all of her grocery shopping/ IADLs. Chart, occupational therapy assessment, plan of care, and goals were reviewed. ASSESSMENT:  PT co tx to maximize pt safety and participation. Pt presented supine in bed upon entry and agreeable to work with OT.  Reviewed THP's w/ WBAT on R LE, energy conservation techniques w/ADLs, and safety have prescription coverage? Yes     Do you have any problems affording any of your prescribed medications? No     Is the patient taking medications as prescribed? yes     Who is going to help you get home at discharge? family     How do you get to doctors appointments? car, drives self;family or friend will provide     Are you on dialysis? No     Discharge Plan A Home     DME Needed Upon Discharge  none     Discharge Plan discussed with: Patient     Discharge Barriers Identified None        Physical Activity    On average, how many days per week do you engage in moderate to strenuous exercise (like a brisk walk)? 7 days     On average, how many minutes do you engage in exercise at this level? 30 min        Financial Resource Strain    How hard is it for you to pay for the very basics like food, housing, medical care, and heating? Not very hard        Housing Stability    In the last 12 months, was there a time when you were not able to pay the mortgage or rent on time? No     In the last 12 months, how many places have you lived? 1     In the last 12 months, was there a time when you did not have a steady place to sleep or slept in a shelter (including now)? No        Transportation Needs    In the past 12 months, has lack of transportation kept you from medical appointments or from getting medications? No        Food Insecurity    Within the past 12 months, you worried that your food would run out before you got the money to buy more. Never true     Within the past 12 months, the food you bought just didn't last and you didn't have money to get more. Never true        Stress    Do you feel stress - tense, restless, nervous, or anxious, or unable to sleep at night because your mind is troubled all the time - these days? Only a little        Social Connections    In a typical week, how many times do you talk on the phone with family, friends, or neighbors? Twice a week     How often do you get together with friends  w/RW and functional transfers/mobility. She came to EOB with MOD A in prep for functional task. STS transfer MIN A with RW and maneuvered to reclining chair MIN A with RW, simulating BSC transfer. Pt required mod cueing for RW mgmt and safety. Pt left with B LE's elevated and all needs left within reach. RN made aware of pt's participation and position. Progression toward goals:  []          Improving appropriately and progressing toward goals  [x]          Improving slowly and progressing toward goals  []          Not making progress toward goals and plan of care will be adjusted     PLAN:  Patient continues to benefit from skilled intervention to address the above impairments. Continue treatment per established plan of care. Further Equipment Recommendations for Discharge:  bedside commode, transfer bench, and rolling walker    AMPAC: Based on an AM-PAC score of 15/24 and their current ADL deficits; it is recommended that the patient have 5-7 sessions per week of Occupational Therapy at d/c to increase the patient's independence. Currently, this patient demonstrates the potential endurance, and/or tolerance for 3 hours of therapy each day at d/c. This AMPAC score should be considered in conjunction with interdisciplinary team recommendations to determine the most appropriate discharge setting. Patient's social support, diagnosis, medical stability, and prior level of function should also be taken into consideration. SUBJECTIVE:   Patient stated When am I going to Rehab? [facility]    OBJECTIVE DATA SUMMARY:   Cognitive/Behavioral Status:  Neurologic State: (P) Alert  Orientation Level: (P) Oriented X4  Cognition: (P) Follows commands  Safety/Judgement: Fall prevention    Functional Mobility and Transfers for ADLs:   Bed Mobility:     Supine to Sit: Moderate assistance     Scooting:  Moderate assistance   Transfers:  Sit to Stand: Minimum assistance  Stand to Sit: Minimum assistance       Balance:  Sitting: Impaired; With support  Sitting - Static: Good (unsupported)  Sitting - Dynamic: Fair (occasional)  Standing: Impaired; With support  Standing - Static: Fair  Standing - Dynamic : Fair      Pain:  Pain level pre-treatment: 6/10   Pain level post-treatment: 6/10  Pain Intervention(s): Medication (see MAR); Rest, Ice, Repositioning   Response to intervention: Nurse notified, See doc flow    Activity Tolerance:    Fair   Please refer to the flowsheet for vital signs taken during this treatment. After treatment:   [x]  Patient left in no apparent distress sitting up in chair  []  Patient left in no apparent distress in bed  [x]  Call bell left within reach  [x]  Nursing notified  [x]  Caregiver present  []  Bed alarm activated    COMMUNICATION/EDUCATION:   [x] Role of Occupational Therapy in the acute care setting  [] Home safety education was provided and the patient/caregiver indicated understanding. [x] Patient/family have participated as able in working towards goals and plan of care. [x] Patient/family agree to work toward stated goals and plan of care. [] Patient understands intent and goals of therapy, but is neutral about his/her participation. [] Patient is unable to participate in goal setting and plan of care. Thank you for this referral.  SHANA De Jesus  Time Calculation: 23 mins    [address] [facility] [address] [address] AM-PAC® Daily Activity Inpatient Short Form (6-Clicks)    How much HELP from another person does the patient currently need    (If the patient hasn't done an activity recently, how much help from another person do you think he/she would need if he/she tried?)   Total (Total A or Dep)   A Lot  (Mod to Max A)   A Little (Sup or Min A)   None (Mod I to I)   Putting on and taking off regular lower body clothing? [] 1 [x] 2 [] 3 [] 4   2. Bathing (including washing, rinsing,      drying)? [] 1 [x] 2 [] 3 [] 4   3.  Toileting, which includes using toilet, bedpan or urinal?   [] 1 [x] 2 [] 3 [] 4 or relatives? Three times a week     How often do you attend Samaritan or Buddhism services? More than 4 times per year     Do you belong to any clubs or organizations such as [employer]aritan groups, unions, fraternal or athletic groups, or school groups? Yes     How often do you attend meetings of the clubs or organizations you belong to? More than 4 times per year     Are you , , , , never , or living with a partner? Patient refused   single       Alcohol Use    Q1: How often do you have a drink containing alcohol? Never     Q2: How many drinks containing alcohol do you have on a typical day when you are drinking? Patient does not drink     Q3: How often do you have six or more drinks on one occasion? Never                   Anticipated DC Dispo: home  Prior Level of Function: independent, lives with 3 dogs  PCP: Dr. Gentile in [place]  Comments:  CM met with patient at bedside to introduce role and discuss d/c planning. Patient is independent, no identified CM needs at this time. Will continue to follow.              4. Putting on and taking off regular upper body clothing? [] 1 [] 2 [x] 3 [] 4   5. Taking care of personal grooming such as brushing teeth? [] 1 [] 2 [x] 3 [] 4   6. Eating meals?    [] 1 [] 2 [x] 3 [] 4

## 2022-10-14 NOTE — PROGRESS NOTES
Spoke with patient via phone @ [phone].  Discussed status change to OBSERVATION.  All questions answered.

## 2022-10-14 NOTE — ASSESSMENT & PLAN NOTE
Patient's FSGs are controlled on current medication regimen.  Last A1c reviewed-   Lab Results   Component Value Date    HGBA1C 6.8 (H) 04/13/2022     Most recent fingerstick glucose reviewed- No results for input(s): POCTGLUCOSE in the last 24 hours.  Current correctional scale  Low  Maintain anti-hyperglycemic dose as follows-   Antihyperglycemics (From admission, onward)    Start     Stop Route Frequency Ordered    10/14/22 0304  insulin aspart U-100 pen 0-5 Units         -- SubQ Before meals & nightly PRN 10/14/22 0205        Hold Oral hypoglycemics while patient is in the hospital.

## 2022-10-14 NOTE — CONSULTS
[facility] - Telemetry ([facility])  Cardiology  Consult Note    Patient Name: Che An  MRN: [id number]  Admission Date: 10/13/2022  Hospital Length of Stay: 1 days  Code Status: Full Code   Attending Provider: Moreno Carter, *   Consulting Provider: Patty Alva NP  Primary Care Physician: Caridad Gentile MD  Principal Problem:Troponin level elevated    Patient information was obtained from patient and ER records.     Inpatient consult to Cardiology  Consult performed by: Patty Alva NP  Consult ordered by: Freda Bauer NP        Subjective:     Chief Complaint:  SVT     HPI:    72-year-old female with a history of diabetes, paroxysmal SVT, hyperlipidemia, gout, gastroesophageal reflux, coronary artery disease, and hypertension presented to [place] [facility] on October 13 with tachycardia. She was last seen by her Cardiologist at [place] September 27. That note mentioned prior SVT that converted to sinus rhythm with adenosine.  She had cardiac catheterization April 13, 2022 (see results below). On October 13 she presented to the [facility] Emergency Department with palpitations for approximately 4 hours. She felt dizzy but had no chest pain or dyspnea. She attempted several vagal maneuvers at home without improvement.  On presentation she had blood pressure 85/55 with heart rate in the 150s. Initial EKG noted supraventricular tachycardia with no acute ST elevation. She received adenosine and converted to sinus rhythm. She also received aspirin. After conversion to sinus rhythm, repeat EKG had ventricular rate 82 within normal sinus rhythm and no acute ST elevation or depression noted.  Referring provider is requesting transfer for further cardiac evaluation.  Requesting transfer to Hospital Medicine at [facility] for further evaluation. In ED White blood cells 7.3, hemoglobin 11.9, hematocrit 37.9, platelets 282, sodium 133, potassium 4.4, chloride 96, CO2 25, BUN  21, creatinine 1, glucose 211, calcium 9.6, AST 19, ALT 18, magnesium 1.8, , troponin 82.2 (normal range 8.4-18.3), BNP 49. Chest x-ray noted no acute cardiopulmonary disease.     Left heart catheterization April 13, 2022:  SUMMARY OF PROCEDURE:        1. Right and left coronary angiogram.      2. Left heart catheterization.      3. Left ventriculogram.      4. Conscious sedation.      5. IFR study to the LAD.      6. FFR study to the LAD.     ASSESSMENT AND PLAN:        1. Angiographically moderate disease in the left anterior descending   with heavy calcification not significant by iFR and FFR criteria.      2. A 40% long lesion in the right coronary artery with heavy   calcification.      3. Otherwise, nonobstructive coronary artery disease.      4. Normal left ventricular end-diastolic pressure and ejection   fraction.     Cardiology consulted for NSTEMI, SVT. Pt seen and examined today, resting comfortably in bed, no CV complaints at this time. EKG reviewed NSR, Labs reviewed Troponin .671-> .519. Pt sees Dr. Rolle in [place]. Needs follow up to discuss ablation, SAIDA workup OP      Past Medical History:   Diagnosis Date    Coronary artery disease     Diabetes mellitus     GERD (gastroesophageal reflux disease)     Gout, unspecified     Hyperlipidemia     Hypertension     SVT (supraventricular tachycardia)        Past Surgical History:   Procedure Laterality Date    CARDIAC CATHETERIZATION         Review of patient's allergies indicates:   Allergen Reactions    Adhesive        No current facility-administered medications on file prior to encounter.     Current Outpatient Medications on File Prior to Encounter   Medication Sig    alendronate (FOSAMAX) 70 MG tablet Take 70 mg by mouth every 7 days.    allopurinoL (ZYLOPRIM) 300 MG tablet Take 300 mg by mouth once daily.    atenoloL (TENORMIN) 100 MG tablet Take 100 mg by mouth once daily.    chlorthalidone (HYGROTEN) 25 MG Tab Take 25 mg by  mouth once daily.    ferrous sulfate 324 mg (65 mg iron) TbEC Take 324 mg by mouth.    glimepiride (AMARYL) 4 MG tablet Take 4 mg by mouth every morning.    HYDROcodone-acetaminophen (NORCO) 7.5-325 mg per tablet Take 1 tablet by mouth 2 (two) times daily as needed.    latanoprost 0.005 % ophthalmic solution Place 1 drop into both eyes every evening.    lisinopriL (PRINIVIL,ZESTRIL) 5 MG tablet Take 5 mg by mouth once daily.    lovastatin (MEVACOR) 20 MG tablet Take 20 mg by mouth once daily.    metFORMIN (GLUCOPHAGE) 1000 MG tablet Take 1,000 mg by mouth 3 (three) times daily.    naloxone (NARCAN) 4 mg/actuation Spry as directed    omeprazole (PRILOSEC) 20 MG capsule Take 20 mg by mouth once daily.    ondansetron (ZOFRAN-ODT) 8 MG TbDL Take 8 mg by mouth 3 (three) times daily as needed.    temazepam (RESTORIL) 30 mg capsule Take 30 mg by mouth every evening.    venlafaxine (EFFEXOR-XR) 150 MG Cp24 Take 150 mg by mouth 2 (two) times daily.     Family History    None       Tobacco Use    Smoking status: Never    Smokeless tobacco: Never   Substance and Sexual Activity    Alcohol use: Not Currently    Drug use: Never    Sexual activity: Not on file     Review of Systems   Constitutional: Negative.   HENT: Negative.     Eyes: Negative.    Cardiovascular: Negative.    Respiratory: Negative.     Skin: Negative.    Musculoskeletal: Negative.    Gastrointestinal: Negative.    Genitourinary: Negative.    Neurological: Negative.    Psychiatric/Behavioral: Negative.     Objective:     Vital Signs (Most Recent):  Temp: 97.8 °F (36.6 °C) (10/14/22 0751)  Pulse: 62 (10/14/22 0759)  Resp: 16 (10/14/22 0741)  BP: (!) 150/66 (10/14/22 0803)  SpO2: 97 % (10/14/22 0741)   Vital Signs (24h Range):  Temp:  [97.8 °F (36.6 °C)-98.3 °F (36.8 °C)] 97.8 °F (36.6 °C)  Pulse:  [56-75] 62  Resp:  [16-20] 16  SpO2:  [97 %-100 %] 97 %  BP: (116-185)/(59-85) 150/66     Weight: 95.2 kg (209 lb 14.1 oz)  Body mass index is 33.88  kg/m².    SpO2: 97 %  O2 Device (Oxygen Therapy): room air    No intake or output data in the 24 hours ending 10/14/22 1053    Lines/Drains/Airways       Peripheral Intravenous Line  Duration                  Peripheral IV - Single Lumen 10/13/22 2000 20 G Anterior;Right Forearm <1 day                    Physical Exam  Vitals and nursing note reviewed.   Constitutional:       Appearance: Normal appearance.   HENT:      Head: Normocephalic.   Eyes:      Pupils: Pupils are equal, round, and reactive to light.   Cardiovascular:      Rate and Rhythm: Normal rate and regular rhythm.      Heart sounds: Normal heart sounds, S1 normal and S2 normal. No murmur heard.    No S3 or S4 sounds.   Pulmonary:      Effort: Pulmonary effort is normal.      Breath sounds: Normal breath sounds.   Abdominal:      General: Bowel sounds are normal.      Palpations: Abdomen is soft.   Musculoskeletal:         General: Normal range of motion.      Cervical back: Normal range of motion.   Skin:     Capillary Refill: Capillary refill takes less than 2 seconds.   Neurological:      General: No focal deficit present.      Mental Status: She is alert and oriented to person, place, and time.   Psychiatric:         Mood and Affect: Mood normal.         Behavior: Behavior normal.         Thought Content: Thought content normal.       Significant Labs: BMP:   Recent Labs   Lab 10/14/22  0453   *      K 4.3      CO2 26   BUN 17   CREATININE 0.8   CALCIUM 9.2   MG 1.6   , CMP   Recent Labs   Lab 10/14/22  0453      K 4.3      CO2 26   *   BUN 17   CREATININE 0.8   CALCIUM 9.2   ANIONGAP 10   , CBC   Recent Labs   Lab 10/14/22  0453   WBC 6.71   HGB 9.9*   HCT 30.8*      , INR No results for input(s): INR, PROTIME in the last 48 hours., Lipid Panel No results for input(s): CHOL, HDL, LDLCALC, TRIG, CHOLHDL in the last 48 hours., Troponin   Recent Labs   Lab 10/14/22  0232 10/14/22  0840   TROPONINI 0.671*  0.519*   , and All pertinent lab results from the last 24 hours have been reviewed.    Significant Imaging: and EKG: Reviewed    Assessment and Plan:     * Troponin level elevated  Troponin .671-> .519  Cont to trend  Elevation likely secondary to SVT episode    CAD (coronary artery disease)  Cont OMT- ASA, statin, lisinopril, BB  Heart cath 4/2022- non obstructive disease      Primary hypertension  Cont meds    Type 2 diabetes mellitus, without long-term current use of insulin  Cont tx per primary team    Paroxysmal SVT (supraventricular tachycardia)  EKG reviewed NSR  Cont Metoprolol 25mg. ASA  DC Atenolol,   [facility] outpatient EP follow up to discuss ablation  SAIDA workup OP        VTE Risk Mitigation (From admission, onward)         Ordered     enoxaparin injection 100 mg  Every 12 hours (non-standard times)         10/14/22 0205     IP VTE HIGH RISK PATIENT  Once         10/14/22 0205     Place sequential compression device  Until discontinued         10/14/22 0205                Thank you for your consult. I will follow-up with patient. Please contact us if you have any additional questions.    Patty Alva, NP  Cardiology   [facility] - Telemetry ([facility])

## 2022-10-14 NOTE — ASSESSMENT & PLAN NOTE
Patient's FSGs are controlled on current medication regimen.  Last A1c reviewed-   Lab Results   Component Value Date    HGBA1C 6.8 (H) 10/14/2022     Most recent fingerstick glucose reviewed-   Recent Labs   Lab 10/14/22  0537 10/14/22  1104   POCTGLUCOSE 138* 132*     Current correctional scale  Low  Maintain anti-hyperglycemic dose as follows-   Antihyperglycemics (From admission, onward)    Start     Stop Route Frequency Ordered    10/14/22 0304  insulin aspart U-100 pen 0-5 Units         -- SubQ Before meals & nightly PRN 10/14/22 0205        Hold Oral hypoglycemics while patient is in the hospital.

## 2022-10-14 NOTE — SUBJECTIVE & OBJECTIVE
Past Medical History:   Diagnosis Date    Coronary artery disease     Diabetes mellitus     GERD (gastroesophageal reflux disease)     Gout, unspecified     Hyperlipidemia     Hypertension     SVT (supraventricular tachycardia)        Past Surgical History:   Procedure Laterality Date    CARDIAC CATHETERIZATION         Review of patient's allergies indicates:   Allergen Reactions    Adhesive        No current facility-administered medications on file prior to encounter.     Current Outpatient Medications on File Prior to Encounter   Medication Sig    alendronate (FOSAMAX) 70 MG tablet Take 70 mg by mouth every 7 days.    allopurinoL (ZYLOPRIM) 300 MG tablet Take 300 mg by mouth once daily.    atenoloL (TENORMIN) 100 MG tablet Take 100 mg by mouth once daily.    chlorthalidone (HYGROTEN) 25 MG Tab Take 25 mg by mouth once daily.    ferrous sulfate 324 mg (65 mg iron) TbEC Take 324 mg by mouth.    glimepiride (AMARYL) 4 MG tablet Take 4 mg by mouth every morning.    HYDROcodone-acetaminophen (NORCO) 7.5-325 mg per tablet Take 1 tablet by mouth 2 (two) times daily as needed.    latanoprost 0.005 % ophthalmic solution Place 1 drop into both eyes every evening.    lisinopriL (PRINIVIL,ZESTRIL) 5 MG tablet Take 5 mg by mouth once daily.    lovastatin (MEVACOR) 20 MG tablet Take 20 mg by mouth once daily.    metFORMIN (GLUCOPHAGE) 1000 MG tablet Take 1,000 mg by mouth 3 (three) times daily.    naloxone (NARCAN) 4 mg/actuation Spry as directed    omeprazole (PRILOSEC) 20 MG capsule Take 20 mg by mouth once daily.    ondansetron (ZOFRAN-ODT) 8 MG TbDL Take 8 mg by mouth 3 (three) times daily as needed.    temazepam (RESTORIL) 30 mg capsule Take 30 mg by mouth every evening.    venlafaxine (EFFEXOR-XR) 150 MG Cp24 Take 150 mg by mouth 2 (two) times daily.     Family History    Reviewed and not pertinent.        Tobacco Use    Smoking status: Never    Smokeless tobacco: Never   Substance and Sexual Activity    Alcohol use: Not  Currently    Drug use: Never    Sexual activity: Not on file     Review of Systems   Constitutional:  Negative for chills, diaphoresis, fatigue and fever.   HENT:  Negative for congestion and sore throat.    Respiratory:  Negative for cough, chest tightness, shortness of breath and wheezing.    Cardiovascular:  Positive for palpitations. Negative for chest pain and leg swelling.   Gastrointestinal:  Negative for abdominal pain, constipation, diarrhea, nausea and vomiting.   Genitourinary:  Negative for dysuria and hematuria.   Musculoskeletal:  Negative for arthralgias, back pain and myalgias.   Skin:  Negative for pallor and rash.   Neurological:  Positive for dizziness. Negative for syncope, weakness, light-headedness, numbness and headaches.   Psychiatric/Behavioral:  The patient is not nervous/anxious.    All other systems reviewed and are negative.  Objective:     Vital Signs (Most Recent):  Temp: 98.2 °F (36.8 °C)   Pulse: 70   Resp: 20   BP: 134/60   SpO2: 97 %  Vital Signs (24h Range):  Temp:  [98.2 °F (36.8 °C)] 98.2 °F (36.8 °C)  Pulse:  [70-75] 70  Resp:  [16-20] 20  SpO2:  [97 %-100 %] 97 %  BP: (116-134)/(60-85) 134/60     Weight: 95.2 kg (209 lb 14.1 oz)  There is no height or weight on file to calculate BMI.    Physical Exam  Vitals and nursing note reviewed.   Constitutional:       General: She is awake. She is not in acute distress.     Appearance: Normal appearance. She is well-developed. She is not diaphoretic.   HENT:      Head: Normocephalic and atraumatic.   Eyes:      Conjunctiva/sclera: Conjunctivae normal.      Comments: PERRL; EOM intact.   Cardiovascular:      Rate and Rhythm: Normal rate and regular rhythm. No extrasystoles are present.     Heart sounds: S1 normal and S2 normal. No murmur heard.    No friction rub. No gallop.   Pulmonary:      Effort: Pulmonary effort is normal. No tachypnea.      Breath sounds: Normal breath sounds and air entry. No wheezing, rhonchi or rales.    Abdominal:      General: Bowel sounds are normal. There is no distension.      Palpations: Abdomen is soft.      Tenderness: There is no abdominal tenderness.   Musculoskeletal:         General: No tenderness or deformity. Normal range of motion.      Cervical back: Normal range of motion and neck supple.      Right lower leg: No edema.      Left lower leg: No edema.   Skin:     General: Skin is warm and dry.      Capillary Refill: Capillary refill takes less than 2 seconds.      Findings: No erythema or rash.   Neurological:      General: No focal deficit present.      Mental Status: She is alert and oriented to person, place, and time.   Psychiatric:         Mood and Affect: Mood and affect normal.         Behavior: Behavior normal. Behavior is cooperative.           Significant Labs: All pertinent labs within the past 24 hours have been reviewed.    Significant Imaging: I have reviewed all pertinent imaging results/findings within the past 24 hours.

## 2022-10-14 NOTE — H&P
[facility] Medicine  History & Physical    Patient Name: Che An  MRN: [id number]  Patient Class: IP- Inpatient  Admission Date: 10/13/2022  Attending Physician: Dex Powell MD   Primary Care Provider: Caridad Gentile MD         Patient information was obtained from patient, past medical records and ER records.     Subjective:     Principal Problem:NSTEMI (non-ST elevated myocardial infarction)    Chief Complaint:   Chief Complaint   Patient presents with    Palpitations        HPI: 72-year-old female with a history of diabetes, paroxysmal SVT, hyperlipidemia, gout, gastroesophageal reflux, coronary artery disease, and hypertension presented to [facility] on October 13 with tachycardia. She was last seen by her Cardiologist at [place] September 27. That note mentioned prior SVT that converted to sinus rhythm with adenosine.  She had cardiac catheterization April 13, 2022 (see results below). On October 13 she presented to the [facility] with palpitations for approximately 4 hours. She felt dizzy but had no chest pain or dyspnea. She attempted several vagal maneuvers at home without improvement.  On presentation she had blood pressure 85/55 with heart rate in the 150s. Initial EKG noted supraventricular tachycardia with no acute ST elevation. She received adenosine and converted to sinus rhythm. She also received aspirin. After conversion to sinus rhythm, repeat EKG had ventricular rate 82 within normal sinus rhythm and no acute ST elevation or depression noted.  Referring provider is requesting transfer for further cardiac evaluation.  Requesting transfer to Hospital Medicine at [facility] for further evaluation.     COVID pending  White blood cells 7.3, hemoglobin 11.9, hematocrit 37.9, platelets 282, sodium 133, potassium 4.4, chloride 96, CO2 25, BUN 21, creatinine 1, glucose 211, calcium 9.6, AST 19, ALT 18, magnesium 1.8, CPK  161, troponin 82.2 (normal range 8.4-18.3), BNP 49     Chest x-ray noted no acute cardiopulmonary disease.     Left heart catheterization April 13, 2022:  SUMMARY OF PROCEDURE:        1. Right and left coronary angiogram.      2. Left heart catheterization.      3. Left ventriculogram.      4. Conscious sedation.      5. IFR study to the LAD.      6. FFR study to the LAD.     ASSESSMENT AND PLAN:        1. Angiographically moderate disease in the left anterior descending   with heavy calcification not significant by iFR and FFR criteria.      2. A 40% long lesion in the right coronary artery with heavy   calcification.      3. Otherwise, nonobstructive coronary artery disease.      4. Normal left ventricular end-diastolic pressure and ejection   fraction.       Past Medical History:   Diagnosis Date    Coronary artery disease     Diabetes mellitus     GERD (gastroesophageal reflux disease)     Gout, unspecified     Hyperlipidemia     Hypertension     SVT (supraventricular tachycardia)        Past Surgical History:   Procedure Laterality Date    CARDIAC CATHETERIZATION         Review of patient's allergies indicates:   Allergen Reactions    Adhesive        No current facility-administered medications on file prior to encounter.     Current Outpatient Medications on File Prior to Encounter   Medication Sig    alendronate (FOSAMAX) 70 MG tablet Take 70 mg by mouth every 7 days.    allopurinoL (ZYLOPRIM) 300 MG tablet Take 300 mg by mouth once daily.    atenoloL (TENORMIN) 100 MG tablet Take 100 mg by mouth once daily.    chlorthalidone (HYGROTEN) 25 MG Tab Take 25 mg by mouth once daily.    ferrous sulfate 324 mg (65 mg iron) TbEC Take 324 mg by mouth.    glimepiride (AMARYL) 4 MG tablet Take 4 mg by mouth every morning.    HYDROcodone-acetaminophen (NORCO) 7.5-325 mg per tablet Take 1 tablet by mouth 2 (two) times daily as needed.    latanoprost 0.005 % ophthalmic solution Place 1 drop into both eyes  every evening.    lisinopriL (PRINIVIL,ZESTRIL) 5 MG tablet Take 5 mg by mouth once daily.    lovastatin (MEVACOR) 20 MG tablet Take 20 mg by mouth once daily.    metFORMIN (GLUCOPHAGE) 1000 MG tablet Take 1,000 mg by mouth 3 (three) times daily.    naloxone (NARCAN) 4 mg/actuation Spry as directed    omeprazole (PRILOSEC) 20 MG capsule Take 20 mg by mouth once daily.    ondansetron (ZOFRAN-ODT) 8 MG TbDL Take 8 mg by mouth 3 (three) times daily as needed.    temazepam (RESTORIL) 30 mg capsule Take 30 mg by mouth every evening.    venlafaxine (EFFEXOR-XR) 150 MG Cp24 Take 150 mg by mouth 2 (two) times daily.     Family History    Reviewed and not pertinent.        Tobacco Use    Smoking status: Never    Smokeless tobacco: Never   Substance and Sexual Activity    Alcohol use: Not Currently    Drug use: Never    Sexual activity: Not on file     Review of Systems   Constitutional:  Negative for chills, diaphoresis, fatigue and fever.   HENT:  Negative for congestion and sore throat.    Respiratory:  Negative for cough, chest tightness, shortness of breath and wheezing.    Cardiovascular:  Positive for palpitations. Negative for chest pain and leg swelling.   Gastrointestinal:  Negative for abdominal pain, constipation, diarrhea, nausea and vomiting.   Genitourinary:  Negative for dysuria and hematuria.   Musculoskeletal:  Negative for arthralgias, back pain and myalgias.   Skin:  Negative for pallor and rash.   Neurological:  Positive for dizziness. Negative for syncope, weakness, light-headedness, numbness and headaches.   Psychiatric/Behavioral:  The patient is not nervous/anxious.    All other systems reviewed and are negative.  Objective:     Vital Signs (Most Recent):  Temp: 98.2 °F (36.8 °C)   Pulse: 70   Resp: 20   BP: 134/60   SpO2: 97 %  Vital Signs (24h Range):  Temp:  [98.2 °F (36.8 °C)] 98.2 °F (36.8 °C)  Pulse:  [70-75] 70  Resp:  [16-20] 20  SpO2:  [97 %-100 %] 97 %  BP: (116-134)/(60-85)  134/60     Weight: 95.2 kg (209 lb 14.1 oz)  There is no height or weight on file to calculate BMI.    Physical Exam  Vitals and nursing note reviewed.   Constitutional:       General: She is awake. She is not in acute distress.     Appearance: Normal appearance. She is well-developed. She is not diaphoretic.   HENT:      Head: Normocephalic and atraumatic.   Eyes:      Conjunctiva/sclera: Conjunctivae normal.      Comments: PERRL; EOM intact.   Cardiovascular:      Rate and Rhythm: Normal rate and regular rhythm. No extrasystoles are present.     Heart sounds: S1 normal and S2 normal. No murmur heard.    No friction rub. No gallop.   Pulmonary:      Effort: Pulmonary effort is normal. No tachypnea.      Breath sounds: Normal breath sounds and air entry. No wheezing, rhonchi or rales.   Abdominal:      General: Bowel sounds are normal. There is no distension.      Palpations: Abdomen is soft.      Tenderness: There is no abdominal tenderness.   Musculoskeletal:         General: No tenderness or deformity. Normal range of motion.      Cervical back: Normal range of motion and neck supple.      Right lower leg: No edema.      Left lower leg: No edema.   Skin:     General: Skin is warm and dry.      Capillary Refill: Capillary refill takes less than 2 seconds.      Findings: No erythema or rash.   Neurological:      General: No focal deficit present.      Mental Status: She is alert and oriented to person, place, and time.   Psychiatric:         Mood and Affect: Mood and affect normal.         Behavior: Behavior normal. Behavior is cooperative.           Significant Labs: All pertinent labs within the past 24 hours have been reviewed.    Significant Imaging: I have reviewed all pertinent imaging results/findings within the past 24 hours.            Assessment/Plan:     * NSTEMI (non-ST elevated myocardial infarction)  - Type I vs. Type II demand secondary to SVT. Troponin 82.2 (normal range 8.4-18.3) in the ED. EKG  without acute ischemic ST-T changes. No CP or anginal equivalent.     - Will give therapeutic dose Lovenox.  - ASA, BB, and statin.    - Trend serial cardiac enzymes and EKG.  - Check FLP.  - Cardiology consult. Will keep NPO for possible LHC.       CAD (coronary artery disease)  - Continue medical management as above.      Paroxysmal SVT (supraventricular tachycardia)  - Converted back to sinus rhythm after adenosine given in the ED. Monitor telemetry.   - Continue BB.  - Check potassium and mag, replace as needed.   - Cardiology consult.       Primary hypertension  - Continue home antihypertensives.       Type 2 diabetes mellitus, without long-term current use of insulin  Patient's FSGs are controlled on current medication regimen.  Last A1c reviewed-   Lab Results   Component Value Date    HGBA1C 6.8 (H) 04/13/2022     Most recent fingerstick glucose reviewed- No results for input(s): POCTGLUCOSE in the last 24 hours.  Current correctional scale  Low  Maintain anti-hyperglycemic dose as follows-   Antihyperglycemics (From admission, onward)    Start     Stop Route Frequency Ordered    10/14/22 0304  insulin aspart U-100 pen 0-5 Units         -- SubQ Before meals & nightly PRN 10/14/22 0205        Hold Oral hypoglycemics while patient is in the hospital.        VTE Risk Mitigation (From admission, onward)         Ordered     IP VTE HIGH RISK PATIENT  Once         10/14/22 0205     Place sequential compression device  Until discontinued         10/14/22 0205     enoxaparin injection 100 mg  Every 12 hours (non-standard times)         10/14/22 0205                   Freda Bauer NP  Department of Hospital Medicine   [facility] - Telemetry ([facility])

## 2022-10-14 NOTE — NURSING
Pt arrived via EMS from [facility]. Vs taken. Tele monitor placed. Hospital Medicine informed of pt's arrival. No pain reported. Oriented to room. Bed low and wheels locked, side rails up x2, call light in reach. Instructed to call for assistance.

## 2022-10-14 NOTE — PLAN OF CARE
[facility] - Telemetry (Hospital)  Discharge Final Note    Primary Care Provider: Caridad Gentile MD    Expected Discharge Date: 10/14/2022    Final Discharge Note (most recent)       Final Note - 10/14/22 1224          Final Note    Assessment Type Final Discharge Note     Anticipated Discharge Disposition Home or Self Care                     Important Message from Medicare             Contact Info       Caridad Gentile MD   Specialty: Family Medicine   Relationship: PCP - General    [address] [place] [id number]   Phone: [phone]       Next Steps: Follow up in 1 week(s)

## 2022-10-14 NOTE — ASSESSMENT & PLAN NOTE
EKG reviewed NSR  Cont Metoprolol 25mg. ASA  DC Atenolol,   [facility] outpatient EP follow up to discuss ablation  SAIDA workup OP